# Patient Record
(demographics unavailable — no encounter records)

---

## 2024-10-16 NOTE — HISTORY OF PRESENT ILLNESS
[Snoring] : snoring [Witnessed Apneas] : witnessed sleep apnea [Date: ___] : Date of most recent diagnostic polysomnogram: [unfilled] [AHI: ___ per hour] : Apnea-hypopnea index:  [unfilled] per hour [CPAP: ___ cmH2O] : CPAP: [unfilled] cmH2O [Awakes Unrefreshed] : does not awake unrefreshed [Awakes with Headache] : no headache upon awakening [FreeTextEntry1] : Hx COPD, CAD.  Feeling better.  Less SOTO. On trelegy. Has albuterol but not taking it. Denies any wheeze.    Has pulse ox at home; checks every day; says even when he is walking is never <90%.    Lost some weight. On wagovy.   Still smoking some.

## 2024-10-16 NOTE — PLAN
[TextEntry] : Continue Trelegy. Albuterol prn. Anish next visit. LDCT for lung cancer screening in March 2025. Check pulse ox with exertion.  Smoking cessation counseled. Cardio f/u. Exercise if ok with cardio; I recc pulm rehab. He is joining a gym he says. Annual flu vaccine. Again recc trt for LAY; he defers.  Weight loss.  Will follow.

## 2024-10-16 NOTE — COUNSELING
[Cessation strategies including cessation program discussed] : Cessation strategies including cessation program discussed [Use of nicotine replacement therapies and other medications discussed] : Use of nicotine replacement therapies and other medications discussed [ - Annual Lung Cancer Screening/Share Decision Making Discussion] : Annual Lung Cancer Screening/Share Decision Making Discussion. (I have advised this patient to have a Low Dose CT (LDCT) scan of the lungs and have discussed the following with the patient in a shared decision making discussion:   Benefits of Detection and Early Treatment: There is adequate evidence that annual screening for lung cancer with LDCT in a population of high-risk persons can prevent a substantial number of lung cancer-related deaths. The magnitude of benefit depends on the individual patient's risk for lung cancer, as those who are at highest risk are most likely to benefit. Screening cannot prevent most lung cancer-related deaths, and does not replace smoking cessation. Harms of Detection and Early Intervention and Treatment: The harms associated with LDCT screening include false-negative and false-positive results, incidental findings, over diagnosis, and radiation exposure. False-positive LDCT results occur in a substantial proportion of screened persons; 95% of all positive results do not lead to a diagnosis of cancer. In a high-quality screening program, further imaging can resolve most false-positive results; however, some patients may require invasive procedures. Radiation harms, including cancer resulting from cumulative exposure to radiation, vary depending on the age at the start of screening; the number of scans received; and the person's exposure to other sources of radiation, particularly other medical imaging.)

## 2024-10-16 NOTE — PROCEDURE
[FreeTextEntry1] : EXAM: [id number] - CT LDCT LUNG CA SCREENING - ORDERED BY: ALEKS ALMEIDA   PROCEDURE DATE: 02/29/2024    INTERPRETATION: SMOKING HISTORY: 69 year old with 123 pack year smoking history; current smoker  TECHNIQUE: Low-dose CT scan of the chest was obtained without administration of intravenous contrast.  COMPARISON: CT lung cancer screening 2/3/2023  FINDINGS:  LUNG NODULES: No nodules.  LUNG PARENCHYMA/AIRWAYS/PLEURA: Emphysema is present. The central airways are patent. No pleural effusion. Partial atelectasis of the medial right middle lobe, lingula and dependent lower lobes, similar to the prior study.  MEDIASTINUM/LYMPH NODES: No thoracic lymphadenopathy.  HEART AND GREAT VESSELS: The heart is normal in size. There is no pericardial effusion. The great vessels are normal in size. Coronary artery calcification.  UPPER ABDOMEN: The low dose technique limits diagnostic evaluation of the organs of the upper abdomen: No gross abnormality is detected in the visualized upper abdomen.  BONES/SOFT TISSUES: No aggressive osseous lesion.  IMPRESSION: Stable exam. No nodules.  SCREENING RECOMMENDATIONS: 12-month screening LDCT, if the patient continues to meet eligibility criteria  LungRADS category: 2 Benign  Reported using the American College of Radiology Lung Imaging Reporting and Data System (Lung-RADS) version 2022  --- End of Report ---       KATRINA SIMS M.D., Attending Radiologist This document has been electronically signed. Mar 9 2024 10:38PM  --------------- EXAM: [id number] - CT LDCT LUNG CA SCREENING - ORDERED BY: YANIVBAILEY ALMEIDA   PROCEDURE DATE: 02/03/2023    INTERPRETATION: CT chest without intravenous contrast  CLINICAL INFORMATION: Lung cancer screening. Smoker. 122 pack year smoking history  TECHNIQUE: Contiguous axial 1 mm sections are obtained to the chest. Sagittal and coronal images were reconstructed from this data set. Axial maximum intensity pixel images were reconstructed.  DOSE INFORMATION: Total DLP for this examination is estimated at 49 mGy-cm.  COMPARISON: CT 1/5/2022  FINDINGS:  LUNGS: The lungs demonstrate coarse bandlike linear opacities in each lung base. No lung consolidation, chest mass or pulmonary nodule is recognized.. PLEURA: No pleural effusion is identified. LARGE AIRWAYS: The trachea and its principal central bronchi are patent.  VESSELS: Atherosclerotic plaque is present at the thoracic aortic arch. HEART: Heart size is normal. No pericardial effusion. Atherosclerotic calcifications present in the coronary arteries. There is moderately prominent epicardial fat. MEDIASTINUM AND ADRIANO: No lymph node enlargement is appreciated. CHEST WALL: Within normal limits.  LOWER NECK/THYROID: No neck mass. Thyroid gland appears intact. UPPER ABDOMEN: Within normal limits.  BONES: No destructive bone lesion is identified. The thoracic spine demonstrate degenerative endplate changes including marginal osteophytes, endplate irregularity and small Schmorl's nodes most evident in the lower thoracic spine. Superior endplate deformity at T11 is long-standing.   IMPRESSION: No pulmonary nodule. Lung-RADS category 1, negative. Recommend reevaluation with noncontrast low-dose CT chest in 12 months.  --- End of Report ---       DEVAUGHN MORTENSEN MD; Attending Radiologist This document has been electronically signed. Feb 7 2023 11:29AM

## 2024-10-16 NOTE — PHYSICAL EXAM
[General Appearance - In No Acute Distress] : no acute distress [Normal Conjunctiva] : the conjunctiva exhibited no abnormalities [Normal Oropharynx] : normal oropharynx [Neck Appearance] : the appearance of the neck was normal [] : the neck was supple [Heart Rate And Rhythm] : heart rate and rhythm were normal [Heart Sounds] : normal S1 and S2 [Bowel Sounds] : normal bowel sounds [Abdomen Soft] : soft [Abnormal Walk] : normal gait [Cyanosis, Localized] : no localized cyanosis [No Focal Deficits] : no focal deficits [Oriented To Time, Place, And Person] : oriented to person, place, and time [Impaired Insight] : insight and judgment were intact [Affect] : the affect was normal [Normal Rate] : the respiratory rate was normal [Rate ___] : at [unfilled] breaths per minute [Normal Rhythm/Effort] : normal respiratory rhythm and effort [Clear Bilaterally] : the lungs were clear to auscultation bilaterally [Prolonged Exp Time] : expiratory time was prolonged [Rales / Crackles Bilateral] : no rales or crackles were heard [Scattered Wheezes] : scattered wheezing was heard

## 2025-02-10 NOTE — CARDIOLOGY SUMMARY
[de-identified] : 8/12/2024 Sinus Rhythm -First degree A-V block  Nazario = 234 Low voltage in limb leads. -Old inferior-apical infarct -Left axis secondary to infarct -consider anterior fascicular block. ABNORMAL  [___] : [unfilled]

## 2025-02-10 NOTE — HISTORY OF PRESENT ILLNESS
[FreeTextEntry1] : CAD S/P PCI-STENT   A) Single vessel CAD, s/p PCI LCx with ROBERT x1 10/2018.  B) TTE 10/2018: LVEF 50-55/ DD 1. Moderate cLVH.   C) TTE 9/12/2022 LVEF 63. No sign Valvular abnormalities.   D) NST 10/13/2022 Normal Study  E) AAA Doppler 9/20/2023 No AAA. Mild plaque.   ROS A)  Admits baseline SOTO which he attributes to COPD. Unchanged. Feels stable and well. B) Already in process of quitting smoking, continued.  C) Denied lightheadedness, dizziness, orthopnea, edema, CP, discomfort, angina, arrhythmia, palpitation, bleeding, syncope, near syncope.   CURRENT CARDIAC MEDS ASA 81 mg daily Atorvastatin 40 mg daily Metoprolol tartrate 25 mg 1/2 tablets twice daily   FUNCTIONAL CAPACITY Reports <4.0 METS Walks 2.5 miles three times a day, on a bike   CHRONIC, STABLE CONDITIONS 1) Severe COPD: Not on home O2.  2) LAY on CPAP: Intolerant to CPAP. 3) Smoker   CARDIAC TESTING  160.02

## 2025-02-10 NOTE — HISTORY OF PRESENT ILLNESS
[FreeTextEntry1] : CAD S/P PCI-STENT   A) Single vessel CAD, s/p PCI LCx with ROBERT x1 10/2018.  B) TTE 10/2018: LVEF 50-55/ DD 1. Moderate cLVH.   C) TTE 9/12/2022 LVEF 63. No sign Valvular abnormalities.   D) NST 10/13/2022 Normal Study  E) AAA Doppler 9/20/2023 No AAA. Mild plaque.   ROS A)  Admits baseline SOTO which he attributes to COPD. Unchanged. Feels stable and well. B) Already in process of quitting smoking, continued.  C) Denied lightheadedness, dizziness, orthopnea, edema, CP, discomfort, angina, arrhythmia, palpitation, bleeding, syncope, near syncope.   CURRENT CARDIAC MEDS ASA 81 mg daily Atorvastatin 40 mg daily Metoprolol tartrate 25 mg 1/2 tablets twice daily   FUNCTIONAL CAPACITY Reports <4.0 METS Walks 2.5 miles three times a day, on a bike   CHRONIC, STABLE CONDITIONS 1) Severe COPD: Not on home O2.  2) LAY on CPAP: Intolerant to CPAP. 3) Smoker   CARDIAC TESTING

## 2025-02-10 NOTE — CARDIOLOGY SUMMARY
[de-identified] : 8/12/2024 Sinus Rhythm -First degree A-V block  Nazario = 234 Low voltage in limb leads. -Old inferior-apical infarct -Left axis secondary to infarct -consider anterior fascicular block. ABNORMAL  [___] : [unfilled]

## 2025-02-25 NOTE — HISTORY OF PRESENT ILLNESS
[Current] : Current [TextBox_13] : Mr. HAWTHORNE is a 70 year old male with a history of CAD s/p stent, HTN, high cholesterol and COPD. Reviewed and confirmed that the patient meets screening eligibility criteria:  70 years old   Smoking Status: Current Smoker  Number of pack(s) per day: 3 ppd x 35 years, 1 ppd x 17 years Number of years smoked: 52 years Number of pack years smokin  No symptoms of lung cancer, including new cough, change in cough, hemoptysis, and unintentional weight loss.  No personal history of lung cancer. No lung cancer in a first degree relative. No history of occupational exposures.   [facility] : [facility]

## 2025-04-21 NOTE — PHYSICAL EXAM
[General Appearance - In No Acute Distress] : no acute distress [Normal Conjunctiva] : the conjunctiva exhibited no abnormalities [Normal Oropharynx] : normal oropharynx [Neck Appearance] : the appearance of the neck was normal [] : the neck was supple [Heart Rate And Rhythm] : heart rate and rhythm were normal [Heart Sounds] : normal S1 and S2 [Bowel Sounds] : normal bowel sounds [Abdomen Soft] : soft [Abnormal Walk] : normal gait [Cyanosis, Localized] : no localized cyanosis [No Focal Deficits] : no focal deficits [Oriented To Time, Place, And Person] : oriented to person, place, and time [Impaired Insight] : insight and judgment were intact [Affect] : the affect was normal [Normal Rate] : the respiratory rate was normal [Rate ___] : at [unfilled] breaths per minute [Normal Rhythm/Effort] : normal respiratory rhythm and effort [Clear Bilaterally] : the lungs were clear to auscultation bilaterally [Scattered Wheezes] : scattered wheezing was heard [Prolonged Exp Time] : expiratory time was prolonged [Rales / Crackles Bilateral] : no rales or crackles were heard

## 2025-04-21 NOTE — PLAN
[TextEntry] : Continue Trelegy. Albuterol prn.  LDCT for lung cancer screening in March 2026. Check pulse ox with exertion.  Smoking cessation counseled. Cardio f/u. Exercise if ok with cardio; I recc pulm rehab. He is joining a gym he says. Annual flu vaccine. Again recc trt for LAY; he defers.  THis patient has LAY and BMI >30 for which Zepbound is FDA indicated. Follow up with Dr Cr about starting zepbound.

## 2025-04-21 NOTE — PROCEDURE
[FreeTextEntry1] : ladonna 4/21/25: moderate obstruction; stable c/w 2022 --------- EXAM: [id number] - CT LDCT LUNG CA SCREENING - ORDERED BY: YANIVBAILEY ALMEIDA   PROCEDURE DATE: 03/03/2025    INTERPRETATION: CLINICAL INFORMATION: Lung Cancer Screening. Current smoker with a 122 pack year smoking history.  COMPARISON: CT chest 2/29/2024  TECHNIQUE: Low Dose Chest CT was obtained without intravenous contrast.  FINDINGS:  AIRWAYS/LUNGS/PLEURA: Patent central airways. Mild upper lobe predominant emphysema. Bibasilar bandlike atelectasis. No lung nodule. No pleural effusion.  MEDIASTINUM AND ADRIANO: No lymphadenopathy.  VESSELS: Multivessel Coronary artery calcification.  HEART: Heart size is normal. Aortic valve calcification. No pericardial effusion.  VISUALIZED UPPER ABDOMEN: Within normal limits.  CHEST WALL AND BONES: Degenerative change the thoracic spine. Chest wall within normal limits.  IMPRESSION: No lung nodule. Multivessel coronary calcifications.  LungRADS 1 Negative  Recommendation: 12 month Low Dose Chest CT is recommended if the person continues to meet eligibility criteria.  --- End of Report ---      GORGE KENDALL MD; Resident Radiologist This document has been electronically signed. DAVE BONNER MD; Attending Radiologist  ----------   EXAM: [id number] - CT LDCT LUNG CA SCREENING - ORDERED BY: YANIVBAILEY ALMEIDA   PROCEDURE DATE: 02/29/2024    INTERPRETATION: SMOKING HISTORY: 69 year old with 123 pack year smoking history; current smoker  TECHNIQUE: Low-dose CT scan of the chest was obtained without administration of intravenous contrast.  COMPARISON: CT lung cancer screening 2/3/2023  FINDINGS:  LUNG NODULES: No nodules.  LUNG PARENCHYMA/AIRWAYS/PLEURA: Emphysema is present. The central airways are patent. No pleural effusion. Partial atelectasis of the medial right middle lobe, lingula and dependent lower lobes, similar to the prior study.  MEDIASTINUM/LYMPH NODES: No thoracic lymphadenopathy.  HEART AND GREAT VESSELS: The heart is normal in size. There is no pericardial effusion. The great vessels are normal in size. Coronary artery calcification.  UPPER ABDOMEN: The low dose technique limits diagnostic evaluation of the organs of the upper abdomen: No gross abnormality is detected in the visualized upper abdomen.  BONES/SOFT TISSUES: No aggressive osseous lesion.  IMPRESSION: Stable exam. No nodules.  SCREENING RECOMMENDATIONS: 12-month screening LDCT, if the patient continues to meet eligibility criteria  LungRADS category: 2 Benign  Reported using the American College of Radiology Lung Imaging Reporting and Data System (Lung-RADS) version 2022  --- End of Report ---       KATRINA SIMS M.D., Attending Radiologist This document has been electronically signed. Mar 9 2024 10:38PM  --------------- EXAM: [id number] - CT LDCT LUNG CA SCREENING - ORDERED BY: ALEKS ALMEIDA   PROCEDURE DATE: 02/03/2023    INTERPRETATION: CT chest without intravenous contrast  CLINICAL INFORMATION: Lung cancer screening. Smoker. 122 pack year smoking history  TECHNIQUE: Contiguous axial 1 mm sections are obtained to the chest. Sagittal and coronal images were reconstructed from this data set. Axial maximum intensity pixel images were reconstructed.  DOSE INFORMATION: Total DLP for this examination is estimated at 49 mGy-cm.  COMPARISON: CT 1/5/2022  FINDINGS:  LUNGS: The lungs demonstrate coarse bandlike linear opacities in each lung base. No lung consolidation, chest mass or pulmonary nodule is recognized.. PLEURA: No pleural effusion is identified. LARGE AIRWAYS: The trachea and its principal central bronchi are patent.  VESSELS: Atherosclerotic plaque is present at the thoracic aortic arch. HEART: Heart size is normal. No pericardial effusion. Atherosclerotic calcifications present in the coronary arteries. There is moderately prominent epicardial fat. MEDIASTINUM AND ADRIANO: No lymph node enlargement is appreciated. CHEST WALL: Within normal limits.  LOWER NECK/THYROID: No neck mass. Thyroid gland appears intact. UPPER ABDOMEN: Within normal limits.  BONES: No destructive bone lesion is identified. The thoracic spine demonstrate degenerative endplate changes including marginal osteophytes, endplate irregularity and small Schmorl's nodes most evident in the lower thoracic spine. Superior endplate deformity at T11 is long-standing.   IMPRESSION: No pulmonary nodule. Lung-RADS category 1, negative. Recommend reevaluation with noncontrast low-dose CT chest in 12 months.  --- End of Report ---       DEVAUGHN MORTENSEN MD; Attending Radiologist This document has been electronically signed. Feb 7 2023 11:29AM

## 2025-04-21 NOTE — HISTORY OF PRESENT ILLNESS
[Snoring] : snoring [Witnessed Apneas] : witnessed sleep apnea [Date: ___] : Date of most recent diagnostic polysomnogram: [unfilled] [AHI: ___ per hour] : Apnea-hypopnea index:  [unfilled] per hour [CPAP: ___ cmH2O] : CPAP: [unfilled] cmH2O [Awakes Unrefreshed] : does not awake unrefreshed [Awakes with Headache] : no headache upon awakening [FreeTextEntry1] : Hx COPD, CAD.  Feels well. Less SOTO. On trelegy. Has albuterol but not needing it. Denies any wheeze.    Has pulse ox at home; checks every day; says even when he is walking is never <90%.    Gained some weight back.  He sees Dr Yasmin Cr ( ph [phone]) for wt loss. Looking to try another drug b/c wegovy no longer covered.   Still smoking some.

## 2025-06-13 NOTE — HISTORY OF PRESENT ILLNESS
[FreeTextEntry1] :   CAD S/P PCI-STENT   Single vessel CAD, s/p PCI LCx with ROBERT x1 10/2018.   TTE 10/2018: LVEF 50-55/ DD 1. Moderate cLVH.    TTE 9/12/2022 LVEF 63. No sign Valvular abnormalities.   NST 10/13/2022 Normal Study  AAA Doppler 9/20/2023 No AAA. Mild plaque.   ROS  Admits baseline SOTO which he attributes to COPD. Unchanged. Feels stable and well.  Denies dizziness, orthopnea, pnd, edema, angina, arrhythmia, bleeding, syncope, near syncope.   CURRENT CARDIAC MEDS ASA 81 mg daily Atorvastatin 40 mg daily Metoprolol tartrate 25 mg 1/2 tablets twice daily   FUNCTIONAL CAPACITY Est >4.0 METS   CHRONIC, STABLE CONDITIONS  Severe COPD: Not on home O2.  LAY on CPAP: Intolerant to CPAP. Smoker   CARDIAC TESTING

## 2025-06-13 NOTE — REVIEW OF SYSTEMS
[Dyspnea on exertion] : dyspnea during exertion [Feeling Fatigued] : not feeling fatigued [SOB] : no shortness of breath [Chest Discomfort] : no chest discomfort [Lower Ext Edema] : no extremity edema [Palpitations] : no palpitations [Syncope] : no syncope [Dizziness] : no dizziness

## 2025-06-13 NOTE — CARDIOLOGY SUMMARY
[___] : [unfilled] [de-identified] : 6/13/2025 Sinus Rhythm -First degree A-V block  Nazario = 224 Low voltage in limb leads. ABNORMAL